# Patient Record
Sex: MALE | Race: WHITE | Employment: UNEMPLOYED | URBAN - NONMETROPOLITAN AREA
[De-identification: names, ages, dates, MRNs, and addresses within clinical notes are randomized per-mention and may not be internally consistent; named-entity substitution may affect disease eponyms.]

---

## 2024-10-08 ENCOUNTER — APPOINTMENT (OUTPATIENT)
Dept: CT IMAGING | Age: 29
End: 2024-10-08

## 2024-10-08 ENCOUNTER — HOSPITAL ENCOUNTER (EMERGENCY)
Age: 29
Discharge: HOME | End: 2024-10-09

## 2024-10-08 DIAGNOSIS — I10 ESSENTIAL HYPERTENSION: Primary | ICD-10-CM

## 2024-10-08 LAB
ALBUMIN SERPL-MCNC: 4.3 G/DL (ref 3.5–5.2)
ALP SERPL-CCNC: 64 U/L (ref 40–129)
ALT SERPL-CCNC: 25 U/L (ref 5–41)
ANION GAP SERPL CALCULATED.3IONS-SCNC: 12 MMOL/L (ref 7–19)
AST SERPL-CCNC: 20 U/L (ref 5–40)
BASOPHILS # BLD: 0.1 K/UL (ref 0–0.2)
BASOPHILS NFR BLD: 0.4 % (ref 0–1)
BILIRUB SERPL-MCNC: 0.6 MG/DL (ref 0.2–1.2)
BUN SERPL-MCNC: 15 MG/DL (ref 6–20)
CALCIUM SERPL-MCNC: 9.3 MG/DL (ref 8.6–10)
CHLORIDE SERPL-SCNC: 101 MMOL/L (ref 98–111)
CO2 SERPL-SCNC: 24 MMOL/L (ref 22–29)
CREAT SERPL-MCNC: 1.3 MG/DL (ref 0.7–1.2)
EOSINOPHIL # BLD: 0.1 K/UL (ref 0–0.6)
EOSINOPHIL NFR BLD: 1.2 % (ref 0–5)
ERYTHROCYTE [DISTWIDTH] IN BLOOD BY AUTOMATED COUNT: 13.4 % (ref 11.5–14.5)
GLUCOSE SERPL-MCNC: 111 MG/DL (ref 70–99)
HCT VFR BLD AUTO: 47.9 % (ref 42–52)
HGB BLD-MCNC: 15.7 G/DL (ref 14–18)
IMM GRANULOCYTES # BLD: 0 K/UL
LYMPHOCYTES # BLD: 1.8 K/UL (ref 1.1–4.5)
LYMPHOCYTES NFR BLD: 16.1 % (ref 20–40)
MCH RBC QN AUTO: 28.6 PG (ref 27–31)
MCHC RBC AUTO-ENTMCNC: 32.8 G/DL (ref 33–37)
MCV RBC AUTO: 87.4 FL (ref 80–94)
MONOCYTES # BLD: 0.7 K/UL (ref 0–0.9)
MONOCYTES NFR BLD: 6.4 % (ref 0–10)
NEUTROPHILS # BLD: 8.4 K/UL (ref 1.5–7.5)
NEUTS SEG NFR BLD: 75.6 % (ref 50–65)
PLATELET # BLD AUTO: 273 K/UL (ref 130–400)
PMV BLD AUTO: 8.8 FL (ref 9.4–12.4)
POTASSIUM SERPL-SCNC: 4 MMOL/L (ref 3.5–5)
PROT SERPL-MCNC: 7.1 G/DL (ref 6.4–8.3)
RBC # BLD AUTO: 5.48 M/UL (ref 4.7–6.1)
SODIUM SERPL-SCNC: 137 MMOL/L (ref 136–145)
WBC # BLD AUTO: 11.2 K/UL (ref 4.8–10.8)

## 2024-10-08 PROCEDURE — 70450 CT HEAD/BRAIN W/O DYE: CPT

## 2024-10-08 PROCEDURE — 99284 EMERGENCY DEPT VISIT MOD MDM: CPT

## 2024-10-09 VITALS
TEMPERATURE: 97.7 F | OXYGEN SATURATION: 99 % | BODY MASS INDEX: 31.39 KG/M2 | DIASTOLIC BLOOD PRESSURE: 111 MMHG | WEIGHT: 200 LBS | RESPIRATION RATE: 22 BRPM | HEIGHT: 67 IN | SYSTOLIC BLOOD PRESSURE: 162 MMHG | HEART RATE: 79 BPM

## 2024-10-09 LAB
AMPHET UR QL SCN: NEGATIVE
BACTERIA URNS QL MICRO: NEGATIVE /HPF
BARBITURATES UR QL SCN: NEGATIVE
BENZODIAZ UR QL SCN: NEGATIVE
BILIRUB UR QL STRIP: NEGATIVE
BUPRENORPHINE URINE: NEGATIVE
CANNABINOIDS UR QL SCN: POSITIVE
CLARITY UR: CLEAR
COCAINE UR QL SCN: NEGATIVE
COLOR UR: YELLOW
CRYSTALS URNS MICRO: NORMAL /HPF
DRUG SCREEN COMMENT UR-IMP: ABNORMAL
EKG P AXIS: 37 DEGREES
EKG P-R INTERVAL: 182 MS
EKG Q-T INTERVAL: 378 MS
EKG QRS DURATION: 92 MS
EKG QTC CALCULATION (BAZETT): 399 MS
EKG T AXIS: 6 DEGREES
EPI CELLS #/AREA URNS AUTO: 0 /HPF (ref 0–5)
FENTANYL SCREEN, URINE: NEGATIVE
GLUCOSE UR STRIP.AUTO-MCNC: NEGATIVE MG/DL
HGB UR STRIP.AUTO-MCNC: NEGATIVE MG/L
HYALINE CASTS #/AREA URNS AUTO: 0 /HPF (ref 0–8)
KETONES UR STRIP.AUTO-MCNC: 40 MG/DL
LEUKOCYTE ESTERASE UR QL STRIP.AUTO: ABNORMAL
METHADONE UR QL SCN: NEGATIVE
METHAMPHETAMINE, URINE: NEGATIVE
NITRITE UR QL STRIP.AUTO: NEGATIVE
OPIATES UR QL SCN: NEGATIVE
OXYCODONE UR QL SCN: NEGATIVE
PCP UR QL SCN: NEGATIVE
PH UR STRIP.AUTO: 7 [PH] (ref 5–8)
PROT UR STRIP.AUTO-MCNC: NEGATIVE MG/DL
RBC #/AREA URNS AUTO: 0 /HPF (ref 0–4)
SP GR UR STRIP.AUTO: 1.02 (ref 1–1.03)
TRICYCLIC ANTIDEPRESSANTS, UR: NEGATIVE
UROBILINOGEN UR STRIP.AUTO-MCNC: 0.2 E.U./DL
WBC #/AREA URNS AUTO: 2 /HPF (ref 0–5)

## 2024-10-09 PROCEDURE — 2580000003 HC RX 258: Performed by: NURSE PRACTITIONER

## 2024-10-09 PROCEDURE — 93005 ELECTROCARDIOGRAM TRACING: CPT | Performed by: NURSE PRACTITIONER

## 2024-10-09 PROCEDURE — 80053 COMPREHEN METABOLIC PANEL: CPT

## 2024-10-09 PROCEDURE — 85025 COMPLETE CBC W/AUTO DIFF WBC: CPT

## 2024-10-09 PROCEDURE — 36415 COLL VENOUS BLD VENIPUNCTURE: CPT

## 2024-10-09 PROCEDURE — G0480 DRUG TEST DEF 1-7 CLASSES: HCPCS

## 2024-10-09 PROCEDURE — 96374 THER/PROPH/DIAG INJ IV PUSH: CPT

## 2024-10-09 PROCEDURE — 80307 DRUG TEST PRSMV CHEM ANLYZR: CPT

## 2024-10-09 PROCEDURE — 81001 URINALYSIS AUTO W/SCOPE: CPT

## 2024-10-09 PROCEDURE — 6360000002 HC RX W HCPCS: Performed by: NURSE PRACTITIONER

## 2024-10-09 PROCEDURE — 6370000000 HC RX 637 (ALT 250 FOR IP): Performed by: NURSE PRACTITIONER

## 2024-10-09 PROCEDURE — 93010 ELECTROCARDIOGRAM REPORT: CPT | Performed by: INTERNAL MEDICINE

## 2024-10-09 RX ORDER — 0.9 % SODIUM CHLORIDE 0.9 %
500 INTRAVENOUS SOLUTION INTRAVENOUS ONCE
Status: COMPLETED | OUTPATIENT
Start: 2024-10-09 | End: 2024-10-09

## 2024-10-09 RX ORDER — LORAZEPAM 2 MG/ML
1 INJECTION INTRAMUSCULAR ONCE
Status: COMPLETED | OUTPATIENT
Start: 2024-10-09 | End: 2024-10-09

## 2024-10-09 RX ORDER — LISINOPRIL 10 MG/1
10 TABLET ORAL ONCE
Status: COMPLETED | OUTPATIENT
Start: 2024-10-09 | End: 2024-10-09

## 2024-10-09 RX ORDER — LISINOPRIL 10 MG/1
10 TABLET ORAL DAILY
Qty: 30 TABLET | Refills: 0 | Status: SHIPPED | OUTPATIENT
Start: 2024-10-09

## 2024-10-09 RX ADMIN — SODIUM CHLORIDE 500 ML: 9 INJECTION, SOLUTION INTRAVENOUS at 00:26

## 2024-10-09 RX ADMIN — LORAZEPAM 1 MG: 2 INJECTION INTRAMUSCULAR; INTRAVENOUS at 00:22

## 2024-10-09 RX ADMIN — LISINOPRIL 10 MG: 10 TABLET ORAL at 00:56

## 2024-10-09 ASSESSMENT — ENCOUNTER SYMPTOMS: RESPIRATORY NEGATIVE: 1

## 2024-10-09 NOTE — ED PROVIDER NOTES
[facility] EMERGENCY DEPT  EMERGENCY DEPARTMENT ENCOUNTER      Pt Name: Bryce Stephens  MRN: [id number]  Birthdate 1995  Date of evaluation: 10/8/2024  Provider: NAYELI Matute NP    CHIEF COMPLAINT       Chief Complaint   Patient presents with    Hypertension     Patient has Hx of hypertension pt states its been 5 months since he has taken meds for it          HISTORY OF PRESENT ILLNESS   (Location/Symptom, Timing/Onset,Context/Setting, Quality, Duration, Modifying Factors, Severity)  Note limiting factors.   Bryce Stephens is a 29 y.o. male who presents to the emergency department with report of elevated blood pressure.  He was previously prescribed blood pressure medication and stopped taking it approximately 5 months ago.  He stated that he was doing \"fine\" but over the last several days began having a headache and noticed that his blood pressure was elevated.  He also endorses a history of anxiety.  His family history includes his mother having a stroke in her late 20s.  Patient is concerned about stroke.  He denies any recent illness.        The history is provided by the patient and the EMS personnel.       NursingNotes were reviewed.    REVIEW OF SYSTEMS    (2-9 systems for level 4, 10 or more for level 5)     Review of Systems   Reason unable to perform ROS: As per HPI.   Constitutional: Negative.  Negative for chills and fever.   Respiratory: Negative.     Cardiovascular: Negative.    Neurological:  Positive for light-headedness and headaches. Negative for seizures, syncope, speech difficulty and numbness.   All other systems reviewed and are negative.      A complete review of systems was performed and is negative except as noted above in the HPI.       PAST MEDICAL HISTORY     Past Medical History:   Diagnosis Date    Hypertension          SURGICAL HISTORY       Past Surgical History:   Procedure Laterality Date    APPENDECTOMY           CURRENT MEDICATIONS       Previous Medications    No

## 2024-10-09 NOTE — DISCHARGE INSTRUCTIONS
Start lisinopril tomorrow.  Your first dose was given today.  Resource for a primary care provider has been given to you.  Call later today to make an appointment.  Return to ER for any new, worsening, or change in condition.

## 2024-10-09 NOTE — ED TRIAGE NOTES
Patient states the past 2 days his blood pressure was high. Patient states he has taken it home several times and came in to the ED today since it was high.